# Patient Record
Sex: MALE | Race: WHITE
[De-identification: names, ages, dates, MRNs, and addresses within clinical notes are randomized per-mention and may not be internally consistent; named-entity substitution may affect disease eponyms.]

---

## 2018-10-25 ENCOUNTER — HOSPITAL ENCOUNTER (OUTPATIENT)
Dept: HOSPITAL 75 - CARD | Age: 38
End: 2018-10-25
Attending: NURSE PRACTITIONER
Payer: COMMERCIAL

## 2018-10-25 DIAGNOSIS — R74.8: ICD-10-CM

## 2018-10-25 DIAGNOSIS — R10.13: Primary | ICD-10-CM

## 2018-10-25 PROCEDURE — 78227 HEPATOBIL SYST IMAGE W/DRUG: CPT

## 2018-10-25 NOTE — DIAGNOSTIC IMAGING REPORT
INDICATION: Epigastric pain.



TECHNIQUE: The patient was administered 5.5 mCi technetium 99m

Choletec intravenously and imaging over the abdomen was

performed. At 60 minutes, the patient ingested one can of Ensure

and gallbladder ejection fraction was calculated.



FINDINGS:  

There is homogeneous uptake of activity by the liver. Prompt

excretion of activity into the common duct and gallbladder is

seen. There is normal passage of activity into the small bowel.

Gallbladder ejection fraction is abnormally low at 18%. Normal

values are 33% or greater.



IMPRESSION:

1. No evidence of cystic duct or common bile duct obstruction.

2. Abnormally low gallbladder ejection fraction of 18%.



Dictated by: 



  Dictated on workstation # [id number]

## 2018-11-06 ENCOUNTER — HOSPITAL ENCOUNTER (OUTPATIENT)
Dept: HOSPITAL 75 - PREOP | Age: 38
Discharge: HOME | End: 2018-11-06
Attending: SURGERY
Payer: COMMERCIAL

## 2018-11-06 VITALS — HEIGHT: 69 IN | WEIGHT: 200 LBS | BODY MASS INDEX: 29.62 KG/M2

## 2018-11-06 DIAGNOSIS — Z01.818: Primary | ICD-10-CM

## 2018-11-08 ENCOUNTER — HOSPITAL ENCOUNTER (OUTPATIENT)
Dept: HOSPITAL 75 - SDC | Age: 38
Discharge: HOME | End: 2018-11-08
Attending: SURGERY
Payer: COMMERCIAL

## 2018-11-08 VITALS — SYSTOLIC BLOOD PRESSURE: 119 MMHG | DIASTOLIC BLOOD PRESSURE: 89 MMHG

## 2018-11-08 VITALS — SYSTOLIC BLOOD PRESSURE: 132 MMHG | DIASTOLIC BLOOD PRESSURE: 95 MMHG

## 2018-11-08 VITALS — BODY MASS INDEX: 29.62 KG/M2 | HEIGHT: 69 IN | WEIGHT: 200 LBS

## 2018-11-08 VITALS — SYSTOLIC BLOOD PRESSURE: 149 MMHG | DIASTOLIC BLOOD PRESSURE: 91 MMHG

## 2018-11-08 VITALS — DIASTOLIC BLOOD PRESSURE: 102 MMHG | SYSTOLIC BLOOD PRESSURE: 134 MMHG

## 2018-11-08 DIAGNOSIS — K21.9: ICD-10-CM

## 2018-11-08 DIAGNOSIS — K81.1: Primary | ICD-10-CM

## 2018-11-08 DIAGNOSIS — Z11.2: ICD-10-CM

## 2018-11-08 DIAGNOSIS — K82.8: ICD-10-CM

## 2018-11-08 DIAGNOSIS — F32.9: ICD-10-CM

## 2018-11-08 DIAGNOSIS — F17.210: ICD-10-CM

## 2018-11-08 DIAGNOSIS — F17.220: ICD-10-CM

## 2018-11-08 LAB
BASOPHILS # BLD AUTO: 0 10^3/UL (ref 0–0.1)
BASOPHILS NFR BLD AUTO: 0 % (ref 0–10)
EOSINOPHIL # BLD AUTO: 0.1 10^3/UL (ref 0–0.3)
EOSINOPHIL NFR BLD AUTO: 1 % (ref 0–10)
ERYTHROCYTE [DISTWIDTH] IN BLOOD BY AUTOMATED COUNT: 13 % (ref 10–14.5)
HCT VFR BLD CALC: 43 % (ref 40–54)
HGB BLD-MCNC: 14.8 G/DL (ref 13.3–17.7)
LYMPHOCYTES # BLD AUTO: 3.1 X 10^3 (ref 1–4)
LYMPHOCYTES NFR BLD AUTO: 36 % (ref 12–44)
MANUAL DIFFERENTIAL PERFORMED BLD QL: NO
MCH RBC QN AUTO: 29 PG (ref 25–34)
MCHC RBC AUTO-ENTMCNC: 35 G/DL (ref 32–36)
MCV RBC AUTO: 83 FL (ref 80–99)
MONOCYTES # BLD AUTO: 0.8 X 10^3 (ref 0–1)
MONOCYTES NFR BLD AUTO: 9 % (ref 0–12)
NEUTROPHILS # BLD AUTO: 4.6 X 10^3 (ref 1.8–7.8)
NEUTROPHILS NFR BLD AUTO: 54 % (ref 42–75)
PLATELET # BLD: 285 10^3/UL (ref 130–400)
PMV BLD AUTO: 10.1 FL (ref 7.4–10.4)
RBC # BLD AUTO: 5.14 10^6/UL (ref 4.35–5.85)
WBC # BLD AUTO: 8.6 10^3/UL (ref 4.3–11)

## 2018-11-08 PROCEDURE — 85025 COMPLETE CBC W/AUTO DIFF WBC: CPT

## 2018-11-08 PROCEDURE — 87081 CULTURE SCREEN ONLY: CPT

## 2018-11-08 PROCEDURE — 94664 DEMO&/EVAL PT USE INHALER: CPT

## 2018-11-08 PROCEDURE — 36415 COLL VENOUS BLD VENIPUNCTURE: CPT

## 2018-11-08 PROCEDURE — 88304 TISSUE EXAM BY PATHOLOGIST: CPT

## 2018-11-08 RX ADMIN — SODIUM CHLORIDE, SODIUM LACTATE, POTASSIUM CHLORIDE, AND CALCIUM CHLORIDE PRN MLS/HR: 600; 310; 30; 20 INJECTION, SOLUTION INTRAVENOUS at 12:00

## 2018-11-08 RX ADMIN — SODIUM CHLORIDE, SODIUM LACTATE, POTASSIUM CHLORIDE, AND CALCIUM CHLORIDE PRN MLS/HR: 600; 310; 30; 20 INJECTION, SOLUTION INTRAVENOUS at 09:00

## 2018-11-08 NOTE — OPERATIVE REPORT
DATE OF SERVICE:  11/08/2018



ATTENDING PRIMARY CARE PHYSICIAN:

Dr. Hilliard.



PREOPERATIVE DIAGNOSIS:

Biliary dyskinesia.



POSTOPERATIVE DIAGNOSIS:

Biliary dyskinesia.



PROCEDURE:

Laparoscopic cholecystectomy.



SURGEON:

Jason Schulz MD



ASSISTANT:

MADELINE Prince



ANESTHESIA:

General endotracheal.



ESTIMATED BLOOD LOSS:

Minimal.



FINDINGS:

Distended gallbladder, no gallbladder wall thickening with a small stone

identified.



DISPOSITION:

The patient tolerated the procedure well.



INDICATIONS:

The patient is a 38-year-old male with a right upper abdominal quadrant pain for

the past 4 to 5 months, usually after eating meals.  He also reported nausea and

occasional episodes of diarrhea.  An ultrasound was performed, which did not

show any gallstones.  He then underwent a HIDA scan, which showed a low ejection

fraction of 18% and reproduction of symptoms consistent with a biliary

dyskinesia.



DESCRIPTION OF PROCEDURE:

The patient was brought to the operating room, laid supine on the table.  After

adequate IV pain and sedative medications and general endotracheal intubation,

the abdomen was prepped and draped in standard surgical fashion.



A 0.5% Marcaine with epinephrine was used to anesthetize the overlying skin in

the left upper abdominal quadrant.  A small transverse skin incision made using

a 15 blade.  An 0 silk suture was applied to the medial aspect of the incision

for retraction and a Veress needle inserted with a low opening pressure of 0

mmHg.  The abdomen was insufflated to 15 mmHg pressure.  The Veress needle

removed and a 5 mm Xcel trocar placed followed by a 5 mm 45 degree angle

laparoscope visualizing the peritoneal cavity.



A 4-quadrant abdominal exploration was performed.  There was gallbladder

distention; however, no gallbladder wall thickening what was visualized the

liver, stomach, omentum appeared normal.  Under direct visualization, we then

proceeded to place a supraumbilical 10 mm port after the skin and peritoneal

lining were anesthetized using 0.5% Marcaine with epinephrine and a transverse

skin incision made using a 15 blade.  In a similar manner, a right upper

abdominal quadrant 5 mm port was placed.



The patient was then placed in reverse Trendelenburg position as well as plane

right side up, left side down.  The fundus of the gallbladder was then retracted

anteriorly and superiorly and the hepatoduodenal ligament was then opened using

blunt dissection using the hook instrument as well as electrocautery.  The

entire critical view of safety was identified including the cystic duct and

artery as the only two structures going into the gallbladder as well as the

cystic plate behind the proximal gallbladder.  A timeout was then taken and the

cystic duct and artery were then clipped proximally, distally and cut with

EndoShears.  The gallbladder was then dissected off the liver bed using cautery

on hook instrument with visualization of good hemostasis as well as no leaking

ducts of Luschka.



The gallbladder was removed through the 10 mm port site using an EndoCatch bag.



The 10 mm port site fascia and peritoneum were then closed under direct

visualization using a Obinna-Maylin device and 0 Vicryl suture.  The abdomen

was desufflated and remaining ports removed.  All skin incisions were closed

using 4-0 Monocryl running subcuticular sutures.  Wounds were then cleaned and

covered with Dermabond.



The patient tolerated the procedure well.  We will start IV and oral pain

medication as well as a clear liquid diet.  Once he is tolerating clears, has

good pain control with oral pain medications, ambulating well, we will discharge

him home.  He will be instructed to do no heavy lifting or exertion for the next

2 weeks and then increase activity as tolerated.





Job ID: [id number]

DocumentID: [id number]

Dictated Date:  11/08/2018 13:00:17

Transcription Date: 11/08/2018 15:24:42

Dictated By: JASON SCHULZ MD

## 2018-11-08 NOTE — DISCHARGE INST-SURGICAL
D/C Lap Instructions-JAZZ


New, Converted, or Re-Newed RX:  RX on Chart


Follow Up Appt in 2 weeks





Activity as tolerated


No driving for 24 hours


No driving while on pain medications





Incentive Spirometry use every 2 hours while awake





Regular Diet





Symptoms to Report: Fever over 101 degree F, Nausea/Vomiting 


Infection Signs and Symptoms to report:  Increased redness, Foul odor of wound, 

Increased drainage





Bathing instructions: May shower


Operative Area Clean/Dry;  Keep incision clean/dry





If any problems/questions: Contact your physician or go to Emergency Room











JASON SCHULZ MD Nov 8, 2018 12:52 pm

## 2018-11-08 NOTE — ANESTHESIA-GENERAL POST-OP
General


Patient Condition


Mental Status/LOC:  Same as Preop


Cardiovascular:  Satisfactory


Nausea/Vomiting:  Absent


Respiratory:  Satisfactory


Pain:  Controlled


Complications:  Absent





Post Op Complications


Complications


None





Follow Up Care/Instructions


Patient Instructions


None needed.





Anesthesia/Patient Condition


Patient Condition


Patient is doing well, no complaints, stable vital signs, no apparent adverse 

anesthesia problems.   


No complications reported per nursing.











DELANEY VEGAS CRNA Nov 8, 2018 14:04

## 2018-11-08 NOTE — PROGRESS NOTE-POST OPERATIVE
Post-Operative Progess Note


Surgeon (s)/Assistant (s)


Surgeon


JASON SCHULZ MD


Assistant:  beronica french APRN





Pre-Operative Diagnosis


biliary dyskinesia





Post-Operative Diagnosis





same





Procedure & Operative Findings


Date of Procedure


11/8/18


Procedure Performed/Findings


laparoscopic cholecystectomy


Anesthesia Type


GET





Estimated Blood Loss


Estimated blood loss (mL):  minimal





Specimens/Packing


Specimens Removed


gallbladder











JASON SCHULZ MD Nov 8, 2018 12:50 pm

## 2018-11-08 NOTE — PROGRESS NOTE-PRE OPERATIVE
Pre-Operative Progress Note


H&P Reviewed


The H&P was reviewed, patient examined and no changes noted.


Date Seen by Provider:  Nov 8, 2018


Time Seen by Provider:  09:20


Date H&P Reviewed:  Nov 8, 2018


Time H&P Reviewed:  09:20


Pre-Operative Diagnosis:  biliary dyskinesia











JASON SCHULZ MD Nov 8, 2018 9:26 am